# Patient Record
Sex: FEMALE | Race: WHITE | NOT HISPANIC OR LATINO | Employment: OTHER | URBAN - METROPOLITAN AREA
[De-identification: names, ages, dates, MRNs, and addresses within clinical notes are randomized per-mention and may not be internally consistent; named-entity substitution may affect disease eponyms.]

---

## 2023-04-27 PROBLEM — I10 HTN (HYPERTENSION): Status: ACTIVE | Noted: 2023-04-27

## 2023-04-27 PROBLEM — E55.9 VITAMIN D DEFICIENCY: Status: ACTIVE | Noted: 2023-04-27

## 2023-04-27 PROBLEM — L72.9 SCALP CYST: Status: ACTIVE | Noted: 2023-04-27

## 2023-04-27 PROBLEM — E78.5 HYPERLIPIDEMIA: Status: ACTIVE | Noted: 2023-04-27

## 2023-04-27 PROBLEM — M79.89 NODULE OF SOFT TISSUE: Status: ACTIVE | Noted: 2023-04-27

## 2023-04-27 PROBLEM — M89.9 DISORDER OF BONE: Status: ACTIVE | Noted: 2023-04-27

## 2023-04-27 PROBLEM — H35.30 MACULAR DEGENERATION: Status: ACTIVE | Noted: 2023-04-27

## 2023-04-27 PROBLEM — M85.80 OSTEOPENIA: Status: ACTIVE | Noted: 2023-04-27

## 2023-04-27 RX ORDER — LOSARTAN POTASSIUM 50 MG/1
1 TABLET ORAL DAILY
COMMUNITY
Start: 2018-07-12 | End: 2023-10-25

## 2023-04-27 RX ORDER — VIT C/E/ZN/COPPR/LUTEIN/ZEAXAN 250MG-90MG
CAPSULE ORAL
COMMUNITY
End: 2024-02-28 | Stop reason: ALTCHOICE

## 2023-04-27 RX ORDER — ACETAMINOPHEN 500 MG
TABLET ORAL
COMMUNITY
Start: 2021-10-14

## 2023-04-27 RX ORDER — HYDROCHLOROTHIAZIDE 12.5 MG/1
1 TABLET ORAL
COMMUNITY
Start: 2019-09-19 | End: 2024-01-29

## 2023-05-12 ENCOUNTER — OFFICE VISIT (OUTPATIENT)
Dept: PRIMARY CARE | Facility: CLINIC | Age: 73
End: 2023-05-12
Payer: MEDICARE

## 2023-05-12 VITALS
DIASTOLIC BLOOD PRESSURE: 63 MMHG | SYSTOLIC BLOOD PRESSURE: 136 MMHG | HEART RATE: 74 BPM | BODY MASS INDEX: 30.86 KG/M2 | RESPIRATION RATE: 19 BRPM | WEIGHT: 158 LBS

## 2023-05-12 DIAGNOSIS — E78.2 MIXED HYPERLIPIDEMIA: ICD-10-CM

## 2023-05-12 DIAGNOSIS — E55.9 VITAMIN D DEFICIENCY: ICD-10-CM

## 2023-05-12 DIAGNOSIS — M85.80 OSTEOPENIA, UNSPECIFIED LOCATION: ICD-10-CM

## 2023-05-12 DIAGNOSIS — I10 PRIMARY HYPERTENSION: Primary | ICD-10-CM

## 2023-05-12 PROBLEM — M79.89 NODULE OF SOFT TISSUE: Status: RESOLVED | Noted: 2023-04-27 | Resolved: 2023-05-12

## 2023-05-12 PROCEDURE — 1159F MED LIST DOCD IN RCRD: CPT | Performed by: INTERNAL MEDICINE

## 2023-05-12 PROCEDURE — 3075F SYST BP GE 130 - 139MM HG: CPT | Performed by: INTERNAL MEDICINE

## 2023-05-12 PROCEDURE — 3078F DIAST BP <80 MM HG: CPT | Performed by: INTERNAL MEDICINE

## 2023-05-12 PROCEDURE — 1160F RVW MEDS BY RX/DR IN RCRD: CPT | Performed by: INTERNAL MEDICINE

## 2023-05-12 PROCEDURE — 99214 OFFICE O/P EST MOD 30 MIN: CPT | Performed by: INTERNAL MEDICINE

## 2023-05-12 PROCEDURE — 1036F TOBACCO NON-USER: CPT | Performed by: INTERNAL MEDICINE

## 2023-05-12 ASSESSMENT — ENCOUNTER SYMPTOMS
HEADACHES: 0
DIARRHEA: 0
NERVOUS/ANXIOUS: 0
CHILLS: 0
ARTHRALGIAS: 0
SLEEP DISTURBANCE: 0
ABDOMINAL DISTENTION: 0
FLANK PAIN: 0
UNEXPECTED WEIGHT CHANGE: 0
DYSURIA: 0
SHORTNESS OF BREATH: 0
CONSTIPATION: 0
COUGH: 0
FREQUENCY: 0
ABDOMINAL PAIN: 0
NAUSEA: 0
FATIGUE: 0
DIZZINESS: 0
VOMITING: 0
APPETITE CHANGE: 0
WHEEZING: 0
CHEST TIGHTNESS: 0
LIGHT-HEADEDNESS: 0
PALPITATIONS: 0
FEVER: 0
DIFFICULTY URINATING: 0
BACK PAIN: 0
ACTIVITY CHANGE: 0
DYSPHORIC MOOD: 0

## 2023-05-12 NOTE — ASSESSMENT & PLAN NOTE
Had lipids that showed LDL of 164 in 11/2022  Not on therapy-pt refuses  Continued encourage for better diet   Will repeat at next visit  Sees cardio annually

## 2023-05-12 NOTE — PROGRESS NOTES
Subjective   Patient ID: Blaire Dahl is a 72 y.o. female who presents for Follow-up.    HPI    Pt here in 6 month follow up.  She is having some sinus issues-she will take Zyrtec if needed.  She doesn't feel she is worse during spring.      She is feeling ok overall.  She saw cardiology for her routine visit.  She is taking her BP medications as directed.  Her weight is stable.  She enjoys eating.      She had Ct cardiac score and it was zero.  She had ultrasound to breast of left breast due to abnormal mammogram that was normal.      Review of Systems   Constitutional:  Negative for activity change, appetite change, chills, fatigue, fever and unexpected weight change.   Respiratory:  Negative for cough, chest tightness, shortness of breath and wheezing.    Cardiovascular:  Negative for chest pain, palpitations and leg swelling.   Gastrointestinal:  Negative for abdominal distention, abdominal pain, constipation, diarrhea, nausea and vomiting.   Genitourinary:  Negative for difficulty urinating, dysuria, flank pain and frequency.   Musculoskeletal:  Negative for arthralgias and back pain.   Neurological:  Negative for dizziness, light-headedness and headaches.   Psychiatric/Behavioral:  Negative for dysphoric mood and sleep disturbance. The patient is not nervous/anxious.        Objective   /63   Pulse 74   Resp 19   Wt 71.7 kg (158 lb)   BMI 30.86 kg/m²    Physical Exam  Constitutional:       Appearance: Normal appearance.   Cardiovascular:      Rate and Rhythm: Normal rate and regular rhythm.      Pulses: Normal pulses.      Heart sounds: Normal heart sounds.   Pulmonary:      Effort: Pulmonary effort is normal.      Breath sounds: Normal breath sounds.   Abdominal:      General: Abdomen is flat. Bowel sounds are normal.      Palpations: Abdomen is soft.   Musculoskeletal:         General: Normal range of motion.   Neurological:      Mental Status: She is alert and oriented to person, place, and  time.   Psychiatric:         Mood and Affect: Mood normal.         Assessment/Plan   Problem List Items Addressed This Visit          Circulatory    HTN (hypertension) - Primary     Stable on current medications   Low salt diet  Encouraged more exercise         Relevant Orders    Follow Up In Primary Care       Musculoskeletal    Osteopenia    Relevant Orders    Follow Up In Primary Care       Endocrine/Metabolic    Vitamin D deficiency    Relevant Orders    Follow Up In Primary Care       Other    Hyperlipidemia     Had lipids that showed LDL of 164 in 11/2022  Not on therapy-pt refuses  Continued encourage for better diet   Will repeat at next visit  Sees cardio annually          Relevant Orders    Follow Up In Primary Care

## 2023-05-12 NOTE — PATIENT INSTRUCTIONS
Continue your medications as directed-call when refills needed  Work on being more active-healthy diet and exercise regularly   See cardiology as directed  Follow up here in 6 months for full physical (come fasting we will do blood work and urine that day)

## 2023-10-20 PROBLEM — J06.9 UPPER RESPIRATORY INFECTION: Status: ACTIVE | Noted: 2023-10-20

## 2023-10-20 PROBLEM — S39.011A STRAIN OF ABDOMINAL MUSCLE: Status: ACTIVE | Noted: 2023-10-20

## 2023-10-20 PROBLEM — J01.90 ACUTE SINUSITIS: Status: ACTIVE | Noted: 2023-10-20

## 2023-10-20 PROBLEM — N39.0 LOWER URINARY TRACT INFECTIOUS DISEASE: Status: ACTIVE | Noted: 2023-10-20

## 2023-10-21 DIAGNOSIS — I10 ESSENTIAL (PRIMARY) HYPERTENSION: ICD-10-CM

## 2023-10-24 RX ORDER — LOSARTAN POTASSIUM 50 MG/1
50 TABLET ORAL DAILY
Status: CANCELLED | OUTPATIENT
Start: 2023-10-24

## 2023-10-25 RX ORDER — LOSARTAN POTASSIUM 100 MG/1
100 TABLET ORAL DAILY
Qty: 90 TABLET | Refills: 3 | Status: SHIPPED | OUTPATIENT
Start: 2023-10-25 | End: 2024-05-07 | Stop reason: SDUPTHER

## 2023-11-17 ENCOUNTER — OFFICE VISIT (OUTPATIENT)
Dept: PRIMARY CARE | Facility: CLINIC | Age: 73
End: 2023-11-17
Payer: MEDICARE

## 2023-11-17 VITALS
WEIGHT: 158 LBS | HEART RATE: 68 BPM | HEIGHT: 60 IN | SYSTOLIC BLOOD PRESSURE: 126 MMHG | DIASTOLIC BLOOD PRESSURE: 80 MMHG | BODY MASS INDEX: 31.02 KG/M2

## 2023-11-17 DIAGNOSIS — E66.09 CLASS 1 OBESITY DUE TO EXCESS CALORIES WITH SERIOUS COMORBIDITY AND BODY MASS INDEX (BMI) OF 30.0 TO 30.9 IN ADULT: ICD-10-CM

## 2023-11-17 DIAGNOSIS — E78.2 MIXED HYPERLIPIDEMIA: ICD-10-CM

## 2023-11-17 DIAGNOSIS — I10 PRIMARY HYPERTENSION: ICD-10-CM

## 2023-11-17 DIAGNOSIS — Z00.00 MEDICARE ANNUAL WELLNESS VISIT, SUBSEQUENT: Primary | ICD-10-CM

## 2023-11-17 DIAGNOSIS — M85.80 OSTEOPENIA, UNSPECIFIED LOCATION: ICD-10-CM

## 2023-11-17 DIAGNOSIS — E55.9 VITAMIN D DEFICIENCY: ICD-10-CM

## 2023-11-17 DIAGNOSIS — R31.1 BENIGN ESSENTIAL MICROSCOPIC HEMATURIA: ICD-10-CM

## 2023-11-17 DIAGNOSIS — Z12.31 SCREENING MAMMOGRAM FOR BREAST CANCER: ICD-10-CM

## 2023-11-17 PROBLEM — E66.811 CLASS 1 OBESITY DUE TO EXCESS CALORIES WITH SERIOUS COMORBIDITY AND BODY MASS INDEX (BMI) OF 30.0 TO 30.9 IN ADULT: Status: ACTIVE | Noted: 2023-11-17

## 2023-11-17 PROBLEM — N39.0 LOWER URINARY TRACT INFECTIOUS DISEASE: Status: RESOLVED | Noted: 2023-10-20 | Resolved: 2023-11-17

## 2023-11-17 PROBLEM — J01.90 ACUTE SINUSITIS: Status: RESOLVED | Noted: 2023-10-20 | Resolved: 2023-11-17

## 2023-11-17 PROBLEM — J06.9 UPPER RESPIRATORY INFECTION: Status: RESOLVED | Noted: 2023-10-20 | Resolved: 2023-11-17

## 2023-11-17 PROBLEM — S39.011A STRAIN OF ABDOMINAL MUSCLE: Status: RESOLVED | Noted: 2023-10-20 | Resolved: 2023-11-17

## 2023-11-17 LAB
25(OH)D3 SERPL-MCNC: 21 NG/ML (ref 30–100)
ALBUMIN SERPL BCP-MCNC: 4.1 G/DL (ref 3.4–5)
ALP SERPL-CCNC: 86 U/L (ref 33–136)
ALT SERPL W P-5'-P-CCNC: 14 U/L (ref 7–45)
ANION GAP SERPL CALC-SCNC: 14 MMOL/L (ref 10–20)
APPEARANCE UR: ABNORMAL
AST SERPL W P-5'-P-CCNC: 16 U/L (ref 9–39)
BACTERIA #/AREA URNS AUTO: ABNORMAL /HPF
BILIRUB SERPL-MCNC: 0.7 MG/DL (ref 0–1.2)
BILIRUB UR STRIP.AUTO-MCNC: NEGATIVE MG/DL
BUN SERPL-MCNC: 21 MG/DL (ref 6–23)
CALCIUM SERPL-MCNC: 9.5 MG/DL (ref 8.6–10.6)
CHLORIDE SERPL-SCNC: 104 MMOL/L (ref 98–107)
CHOLEST SERPL-MCNC: 258 MG/DL (ref 0–199)
CHOLESTEROL/HDL RATIO: 4.8
CO2 SERPL-SCNC: 26 MMOL/L (ref 21–32)
COLOR UR: YELLOW
CREAT SERPL-MCNC: 0.8 MG/DL (ref 0.5–1.05)
ERYTHROCYTE [DISTWIDTH] IN BLOOD BY AUTOMATED COUNT: 13.3 % (ref 11.5–14.5)
GFR SERPL CREATININE-BSD FRML MDRD: 77 ML/MIN/1.73M*2
GLUCOSE SERPL-MCNC: 100 MG/DL (ref 74–99)
GLUCOSE UR STRIP.AUTO-MCNC: NEGATIVE MG/DL
HCT VFR BLD AUTO: 43.2 % (ref 36–46)
HDLC SERPL-MCNC: 53.4 MG/DL
HGB BLD-MCNC: 13.7 G/DL (ref 12–16)
KETONES UR STRIP.AUTO-MCNC: NEGATIVE MG/DL
LDLC SERPL CALC-MCNC: 166 MG/DL
LEUKOCYTE ESTERASE UR QL STRIP.AUTO: ABNORMAL
MCH RBC QN AUTO: 30 PG (ref 26–34)
MCHC RBC AUTO-ENTMCNC: 31.7 G/DL (ref 32–36)
MCV RBC AUTO: 95 FL (ref 80–100)
MUCOUS THREADS #/AREA URNS AUTO: ABNORMAL /LPF
NITRITE UR QL STRIP.AUTO: POSITIVE
NON HDL CHOLESTEROL: 205 MG/DL (ref 0–149)
NRBC BLD-RTO: 0 /100 WBCS (ref 0–0)
PH UR STRIP.AUTO: 5 [PH]
PLATELET # BLD AUTO: 226 X10*3/UL (ref 150–450)
POTASSIUM SERPL-SCNC: 3.9 MMOL/L (ref 3.5–5.3)
PROT SERPL-MCNC: 6.5 G/DL (ref 6.4–8.2)
PROT UR STRIP.AUTO-MCNC: NEGATIVE MG/DL
RBC # BLD AUTO: 4.56 X10*6/UL (ref 4–5.2)
RBC # UR STRIP.AUTO: NEGATIVE /UL
RBC #/AREA URNS AUTO: ABNORMAL /HPF
SODIUM SERPL-SCNC: 140 MMOL/L (ref 136–145)
SP GR UR STRIP.AUTO: 1.01
SQUAMOUS #/AREA URNS AUTO: ABNORMAL /HPF
TRIGL SERPL-MCNC: 195 MG/DL (ref 0–149)
UROBILINOGEN UR STRIP.AUTO-MCNC: <2 MG/DL
VLDL: 39 MG/DL (ref 0–40)
WBC # BLD AUTO: 5.2 X10*3/UL (ref 4.4–11.3)
WBC #/AREA URNS AUTO: ABNORMAL /HPF

## 2023-11-17 PROCEDURE — 3074F SYST BP LT 130 MM HG: CPT | Performed by: INTERNAL MEDICINE

## 2023-11-17 PROCEDURE — 1036F TOBACCO NON-USER: CPT | Performed by: INTERNAL MEDICINE

## 2023-11-17 PROCEDURE — 1170F FXNL STATUS ASSESSED: CPT | Performed by: INTERNAL MEDICINE

## 2023-11-17 PROCEDURE — 80053 COMPREHEN METABOLIC PANEL: CPT

## 2023-11-17 PROCEDURE — 36415 COLL VENOUS BLD VENIPUNCTURE: CPT

## 2023-11-17 PROCEDURE — 82306 VITAMIN D 25 HYDROXY: CPT

## 2023-11-17 PROCEDURE — 1159F MED LIST DOCD IN RCRD: CPT | Performed by: INTERNAL MEDICINE

## 2023-11-17 PROCEDURE — 80061 LIPID PANEL: CPT

## 2023-11-17 PROCEDURE — 99214 OFFICE O/P EST MOD 30 MIN: CPT | Performed by: INTERNAL MEDICINE

## 2023-11-17 PROCEDURE — 81001 URINALYSIS AUTO W/SCOPE: CPT

## 2023-11-17 PROCEDURE — 85027 COMPLETE CBC AUTOMATED: CPT

## 2023-11-17 PROCEDURE — 1160F RVW MEDS BY RX/DR IN RCRD: CPT | Performed by: INTERNAL MEDICINE

## 2023-11-17 PROCEDURE — 3079F DIAST BP 80-89 MM HG: CPT | Performed by: INTERNAL MEDICINE

## 2023-11-17 PROCEDURE — G0439 PPPS, SUBSEQ VISIT: HCPCS | Performed by: INTERNAL MEDICINE

## 2023-11-17 ASSESSMENT — ENCOUNTER SYMPTOMS
SLEEP DISTURBANCE: 0
NUMBNESS: 0
FATIGUE: 0
SINUS PAIN: 0
RECTAL PAIN: 0
POLYDIPSIA: 0
EYE REDNESS: 0
VOICE CHANGE: 0
WOUND: 0
SPEECH DIFFICULTY: 0
NERVOUS/ANXIOUS: 0
DYSPHORIC MOOD: 0
RHINORRHEA: 0
SINUS PRESSURE: 0
ABDOMINAL PAIN: 0
NECK PAIN: 0
FACIAL ASYMMETRY: 0
HEMATURIA: 0
SEIZURES: 0
ABDOMINAL DISTENTION: 0
EYE DISCHARGE: 0
ARTHRALGIAS: 1
CHOKING: 0
DECREASED CONCENTRATION: 0
CHILLS: 0
SORE THROAT: 0
APNEA: 0
EYE ITCHING: 0
ACTIVITY CHANGE: 0
NAUSEA: 0
BLOOD IN STOOL: 0
CONSTIPATION: 0
HALLUCINATIONS: 0
DIARRHEA: 0
ANAL BLEEDING: 0
DIFFICULTY URINATING: 0
STRIDOR: 0
UNEXPECTED WEIGHT CHANGE: 0
APPETITE CHANGE: 0
VOMITING: 0
HYPERACTIVE: 0
PHOTOPHOBIA: 0
FEVER: 0
WEAKNESS: 0
FACIAL SWELLING: 0
FREQUENCY: 0
POLYPHAGIA: 0
NECK STIFFNESS: 0
BACK PAIN: 0
COLOR CHANGE: 0
CHEST TIGHTNESS: 0
PALPITATIONS: 0
WHEEZING: 0
FLANK PAIN: 0
ADENOPATHY: 0
DIZZINESS: 0
AGITATION: 0
JOINT SWELLING: 0
DYSURIA: 0
SHORTNESS OF BREATH: 0
DIAPHORESIS: 0
COUGH: 0
BRUISES/BLEEDS EASILY: 0
CONFUSION: 0
HEADACHES: 0
EYE PAIN: 0
LIGHT-HEADEDNESS: 0
TROUBLE SWALLOWING: 0
TREMORS: 0
MYALGIAS: 0

## 2023-11-17 ASSESSMENT — ACTIVITIES OF DAILY LIVING (ADL)
BATHING: INDEPENDENT
DOING_HOUSEWORK: INDEPENDENT
GROCERY_SHOPPING: INDEPENDENT
DRESSING: INDEPENDENT
MANAGING_FINANCES: INDEPENDENT
TAKING_MEDICATION: INDEPENDENT

## 2023-11-17 ASSESSMENT — PATIENT HEALTH QUESTIONNAIRE - PHQ9
1. LITTLE INTEREST OR PLEASURE IN DOING THINGS: NOT AT ALL
2. FEELING DOWN, DEPRESSED OR HOPELESS: NOT AT ALL
SUM OF ALL RESPONSES TO PHQ9 QUESTIONS 1 AND 2: 0

## 2023-11-17 NOTE — PROGRESS NOTES
Subjective   Reason for Visit: Blaire Dahl is an 73 y.o. female here for a Medicare Wellness visit.     Past Medical, Surgical, and Family History reviewed and updated in chart.    Reviewed all medications by prescribing practitioner or clinical pharmacist (such as prescriptions, OTCs, herbal therapies and supplements) and documented in the medical record.    HPI  Pt here for MWV.  Her diet is the same.  She is active but does not exercise.  Her weight is stable.      She has her colonoscopies regularly as her mother had colon cancer. She last had one in 2021-she goes every 5 years.  No GI issues-bowels move without issues.      She did fall the other day after tripping on her bed spread.  She went to urgent care and they xray the knee but it was ok-no fracture.  They told her it was badly bruised.      She is suppose to wear hearing aids but she doesn't like them and she lost one.  She sees eye doc regularly.      She doesn't get vaccines normally.  She declines the flu shot.      She had mammogram late 12/2022 and did note abnormality of right breast. She had ultrasound in 1/2023 that showed cyst only-benign.      She had dexa that showed osteopenia.      Patient Care Team:  Marsha VANCE DO as PCP - General  Marsha VANCE DO as PCP - [facility] [place] [facility] Attributed Provider  Gavin Altman MD as Consulting Physician (Cardiology)     Review of Systems   Constitutional:  Negative for activity change, appetite change, chills, diaphoresis, fatigue, fever and unexpected weight change.   HENT:  Positive for postnasal drip. Negative for congestion, dental problem, drooling, ear discharge, ear pain, facial swelling, hearing loss, mouth sores, nosebleeds, rhinorrhea, sinus pressure, sinus pain, sneezing, sore throat, tinnitus, trouble swallowing and voice change.    Eyes:  Negative for photophobia, pain, discharge, redness, itching and visual disturbance.   Respiratory:  Negative for apnea, cough, choking, chest tightness,  shortness of breath, wheezing and stridor.    Cardiovascular:  Negative for chest pain, palpitations and leg swelling.   Gastrointestinal:  Negative for abdominal distention, abdominal pain, anal bleeding, blood in stool, constipation, diarrhea, nausea, rectal pain and vomiting.   Endocrine: Negative for cold intolerance, heat intolerance, polydipsia, polyphagia and polyuria.   Genitourinary:  Negative for decreased urine volume, difficulty urinating, dyspareunia, dysuria, enuresis, flank pain, frequency, genital sores, hematuria, menstrual problem, pelvic pain, urgency, vaginal bleeding, vaginal discharge and vaginal pain.   Musculoskeletal:  Positive for arthralgias (right knee pain). Negative for back pain, gait problem, joint swelling, myalgias, neck pain and neck stiffness.   Skin:  Negative for color change, pallor, rash and wound.   Allergic/Immunologic: Negative for environmental allergies, food allergies and immunocompromised state.   Neurological:  Negative for dizziness, tremors, seizures, syncope, facial asymmetry, speech difficulty, weakness, light-headedness, numbness and headaches.   Hematological:  Negative for adenopathy. Does not bruise/bleed easily.   Psychiatric/Behavioral:  Negative for agitation, behavioral problems, confusion, decreased concentration, dysphoric mood, hallucinations, self-injury, sleep disturbance and suicidal ideas. The patient is not nervous/anxious and is not hyperactive.        Objective   Vitals:  /80 (BP Location: Right arm, Patient Position: Sitting)   Pulse 68   Ht 1.524 m (5')   Wt 71.7 kg (158 lb)   BMI 30.86 kg/m²       Physical Exam  Constitutional:       Appearance: Normal appearance.   HENT:      Head: Normocephalic and atraumatic.      Right Ear: Tympanic membrane, ear canal and external ear normal. There is no impacted cerumen.      Left Ear: Tympanic membrane, ear canal and external ear normal. There is no impacted cerumen.      Nose: Nose normal. No  congestion or rhinorrhea.      Mouth/Throat:      Mouth: Mucous membranes are moist.      Pharynx: Oropharynx is clear. No oropharyngeal exudate or posterior oropharyngeal erythema.   Eyes:      Extraocular Movements: Extraocular movements intact.      Conjunctiva/sclera: Conjunctivae normal.      Pupils: Pupils are equal, round, and reactive to light.   Neck:      Vascular: No carotid bruit.   Cardiovascular:      Rate and Rhythm: Normal rate and regular rhythm.      Pulses: Normal pulses.      Heart sounds: Normal heart sounds. No murmur heard.  Pulmonary:      Effort: Pulmonary effort is normal. No respiratory distress.      Breath sounds: Normal breath sounds. No wheezing, rhonchi or rales.   Abdominal:      General: Abdomen is flat. Bowel sounds are normal. There is no distension.      Palpations: Abdomen is soft.      Tenderness: There is no abdominal tenderness.      Hernia: No hernia is present.   Musculoskeletal:         General: No swelling or tenderness. Normal range of motion.      Cervical back: Normal range of motion and neck supple.      Right lower leg: No edema.      Left lower leg: No edema.   Lymphadenopathy:      Cervical: No cervical adenopathy.   Skin:     General: Skin is warm and dry.      Findings: No lesion or rash.   Neurological:      General: No focal deficit present.      Mental Status: She is alert and oriented to person, place, and time.      Cranial Nerves: No cranial nerve deficit.      Sensory: No sensory deficit.      Motor: No weakness.   Psychiatric:         Mood and Affect: Mood normal.         Behavior: Behavior normal.         Thought Content: Thought content normal.         Judgment: Judgment normal.         Assessment/Plan   Problem List Items Addressed This Visit       HTN (hypertension)    Current Assessment & Plan     Well controlled on hydrochlorothiazide/losartan   She sees cardiology yearly          Relevant Orders    Follow Up In Primary Care - Established     Comprehensive Metabolic Panel    CBC    Hyperlipidemia    Relevant Orders    Follow Up In Primary Care - Established    Lipid Panel    Osteopenia    Current Assessment & Plan     Encouraged her to start calcium 1200 mg along with her vitamin D   Weight based exercises          Relevant Orders    Follow Up In Primary Care - Established    Vitamin D deficiency    Relevant Orders    Follow Up In Primary Care - Established    Vitamin D 25-Hydroxy,Total (for eval of Vitamin D levels)    Class 1 obesity due to excess calories with serious comorbidity and body mass index (BMI) of 30.0 to 30.9 in adult    Current Assessment & Plan     Encouraged patient to increase exercise and to try to eat healthier          Relevant Orders    Follow Up In Primary Care - Established     Other Visit Diagnoses       Medicare annual wellness visit, subsequent    -  Primary    Screening mammogram for breast cancer        Relevant Orders    BI mammo bilateral screening tomosynthesis    Benign essential microscopic hematuria        Relevant Orders    Urinalysis with Reflex Microscopic

## 2023-11-17 NOTE — PATIENT INSTRUCTIONS
Get back into dermatologist to check the skin lesion on the left arm   We did blood work today and will call with results   Get a living will and DPOA of healthcare with all your medical wishes and bring us a copy so we can put in chart  Get your mammogram done when due-after 12/23/2023-order is in  Take calcium 1200 mg/day and vitamin D3 2000 units for bone health  Continue healthy diet and exercise regularly as able  Follow up in 6 months-sooner if needed

## 2023-11-21 ENCOUNTER — TELEPHONE (OUTPATIENT)
Dept: PRIMARY CARE | Facility: CLINIC | Age: 73
End: 2023-11-21
Payer: MEDICARE

## 2023-11-21 NOTE — TELEPHONE ENCOUNTER
----- Message from Marsha VANCE DO sent at 11/20/2023  7:52 AM EST -----  Make sure pt not having any UTI symptoms as her urine did show some signs of possible infection  Her vitamin D was low so needs to take OTC D3 5131-4201 units/day   Her cholesterol is high with LDL of 166 which goal is 100 or less-so recommend better diet-lean meats/fish and veggies

## 2023-12-19 ENCOUNTER — OFFICE VISIT (OUTPATIENT)
Dept: CARDIOLOGY | Facility: CLINIC | Age: 73
End: 2023-12-19
Payer: MEDICARE

## 2023-12-19 VITALS
BODY MASS INDEX: 30.02 KG/M2 | SYSTOLIC BLOOD PRESSURE: 120 MMHG | HEIGHT: 61 IN | DIASTOLIC BLOOD PRESSURE: 80 MMHG | HEART RATE: 74 BPM | OXYGEN SATURATION: 96 % | WEIGHT: 159 LBS

## 2023-12-19 DIAGNOSIS — E66.09 CLASS 1 OBESITY DUE TO EXCESS CALORIES WITH SERIOUS COMORBIDITY AND BODY MASS INDEX (BMI) OF 30.0 TO 30.9 IN ADULT: ICD-10-CM

## 2023-12-19 DIAGNOSIS — I10 PRIMARY HYPERTENSION: ICD-10-CM

## 2023-12-19 DIAGNOSIS — E78.49 OTHER HYPERLIPIDEMIA: Primary | ICD-10-CM

## 2023-12-19 PROCEDURE — 3074F SYST BP LT 130 MM HG: CPT | Performed by: INTERNAL MEDICINE

## 2023-12-19 PROCEDURE — 99213 OFFICE O/P EST LOW 20 MIN: CPT | Performed by: INTERNAL MEDICINE

## 2023-12-19 PROCEDURE — 1159F MED LIST DOCD IN RCRD: CPT | Performed by: INTERNAL MEDICINE

## 2023-12-19 PROCEDURE — 1036F TOBACCO NON-USER: CPT | Performed by: INTERNAL MEDICINE

## 2023-12-19 PROCEDURE — 1160F RVW MEDS BY RX/DR IN RCRD: CPT | Performed by: INTERNAL MEDICINE

## 2023-12-19 PROCEDURE — 3079F DIAST BP 80-89 MM HG: CPT | Performed by: INTERNAL MEDICINE

## 2023-12-19 RX ORDER — VIT A/VIT C/VIT E/ZINC/COPPER 2148-113
TABLET ORAL
COMMUNITY

## 2023-12-19 ASSESSMENT — ENCOUNTER SYMPTOMS: DYSPNEA ON EXERTION: 1

## 2023-12-19 NOTE — PATIENT INSTRUCTIONS
Your CT calcium score from last year is zero.  Your risk of heart attack is very low.    No changes in your medications.

## 2023-12-19 NOTE — PROGRESS NOTES
"Subjective   Blaire Dahl is a 73 y.o. female.    Chief Complaint:  Follow-up hypertension, hyperlipidemia, CT calcium scoring.    HPI    Over the past year she has felt well.  She does go to weight watchers to try to lose weight.  However she does not really follow the program very well.  Does not exercise on a regular basis.  No chest pain or chest pressure.  Mild dyspnea with exertion.  Otherwise she has been asymptomatic.  No other medical problems or medical issues since we last saw her.    Her past cardiac history is significant for history of hypertension. She's had a prior calcium score several years ago which was zero indicating the absence of atherosclerosis in the distribution of the coronary arteries.     She does have history of hyperlipidemia. On prior CT scan in her ascending aorta was slightly dilated.     Allergies  Medication    · No Known Drug Allergies   Recorded By: Kayleen Call; 1/10/2015 12:49:29 PM     Family History  Mother    · Family history of diabetes mellitus (V18.0) (Z83.3)   · Family history of malignant neoplasm of colon (V16.0) (Z80.0)   ·  at age 62 from colon cancer            Father    · Family history of Aneurysm   · Family history of aortic aneurysm (V17.49) (Z82.49)   · Family history of hypertension (V17.49) (Z82.49)     Social History  Problems    · Caffeine use (V49.89) (Z78.9)   · 1-2 cups coffee per day   · Has no children   ·    · Never a smoker      Review of Systems   Cardiovascular:  Positive for dyspnea on exertion.   Musculoskeletal:  Positive for arthritis.   All other systems reviewed and are negative.      Visit Vitals  /80 (BP Location: Left arm, Patient Position: Sitting, BP Cuff Size: Adult)   Pulse 74   Ht 1.549 m (5' 1\")   Wt 72.1 kg (159 lb)   SpO2 96%   BMI 30.04 kg/m²   Smoking Status Never   BSA 1.76 m²        Objective     Constitutional:       Appearance: Not in distress.   Neck:      Vascular: JVD normal.   Pulmonary:      " Breath sounds: Normal breath sounds.   Cardiovascular:      Normal rate. Regular rhythm. Normal S1. Normal S2.       Murmurs: There is no murmur.      No gallop.    Pulses:     Intact distal pulses.   Edema:     Peripheral edema absent.   Abdominal:      General: There is no distension.      Palpations: Abdomen is soft.   Neurological:      Mental Status: Alert.         Lab Review:   Lab Results   Component Value Date     11/17/2023    K 3.9 11/17/2023     11/17/2023    CO2 26 11/17/2023    BUN 21 11/17/2023    CREATININE 0.80 11/17/2023    GLUCOSE 100 (H) 11/17/2023    CALCIUM 9.5 11/17/2023     Lab Results   Component Value Date    CHOL 258 (H) 11/17/2023    TRIG 195 (H) 11/17/2023    HDL 53.4 11/17/2023       Assessment:    1.  Hypertension.  Blood pressures are well-controlled.    2.  Hyperlipidemia.  Patient CT calcium score is 0.  We personally reviewed the CT scan with the patient.  There is no atherosclerosis in the distribution of the coronary arteries.  This makes her risk of myocardial infarction is extremely low.  Under the circumstances lipid therapy is not recommended.   Other (Free Text): Today's visit was planned to perform a  biopsy to determine questionable involvement of surrounding erythematous area as demonstrated by the photo take at time of biopsy. On exam today, the 'questionable area' had completely resolved, so much so that i felt comfortable treating with ED&C today.  Patient agreed with the plan, and was appreciative Detail Level: Zone Note Text (......Xxx Chief Complaint.): This diagnosis correlates with the

## 2023-12-29 ENCOUNTER — APPOINTMENT (OUTPATIENT)
Dept: RADIOLOGY | Facility: CLINIC | Age: 73
End: 2023-12-29
Payer: MEDICARE

## 2024-01-03 ENCOUNTER — ANCILLARY PROCEDURE (OUTPATIENT)
Dept: RADIOLOGY | Facility: CLINIC | Age: 74
End: 2024-01-03
Payer: MEDICARE

## 2024-01-03 DIAGNOSIS — Z12.31 SCREENING MAMMOGRAM FOR BREAST CANCER: ICD-10-CM

## 2024-01-03 PROCEDURE — 77063 BREAST TOMOSYNTHESIS BI: CPT

## 2024-01-03 PROCEDURE — 77067 SCR MAMMO BI INCL CAD: CPT | Performed by: RADIOLOGY

## 2024-01-03 PROCEDURE — 77063 BREAST TOMOSYNTHESIS BI: CPT | Performed by: RADIOLOGY

## 2024-01-28 DIAGNOSIS — I10 ESSENTIAL (PRIMARY) HYPERTENSION: ICD-10-CM

## 2024-01-29 RX ORDER — HYDROCHLOROTHIAZIDE 12.5 MG/1
12.5 TABLET ORAL
Qty: 90 TABLET | Refills: 3 | Status: SHIPPED | OUTPATIENT
Start: 2024-01-29

## 2024-02-28 ENCOUNTER — OFFICE VISIT (OUTPATIENT)
Dept: PRIMARY CARE | Facility: CLINIC | Age: 74
End: 2024-02-28
Payer: MEDICARE

## 2024-02-28 VITALS
DIASTOLIC BLOOD PRESSURE: 93 MMHG | WEIGHT: 159.1 LBS | BODY MASS INDEX: 32.08 KG/M2 | SYSTOLIC BLOOD PRESSURE: 159 MMHG | HEART RATE: 81 BPM | HEIGHT: 59 IN

## 2024-02-28 DIAGNOSIS — E55.9 VITAMIN D DEFICIENCY: ICD-10-CM

## 2024-02-28 DIAGNOSIS — R10.9 RIGHT FLANK DISCOMFORT: ICD-10-CM

## 2024-02-28 DIAGNOSIS — E78.2 MIXED HYPERLIPIDEMIA: ICD-10-CM

## 2024-02-28 DIAGNOSIS — R23.2 HOT FLASHES: ICD-10-CM

## 2024-02-28 DIAGNOSIS — E66.09 CLASS 1 OBESITY DUE TO EXCESS CALORIES WITH SERIOUS COMORBIDITY AND BODY MASS INDEX (BMI) OF 32.0 TO 32.9 IN ADULT: ICD-10-CM

## 2024-02-28 DIAGNOSIS — I10 PRIMARY HYPERTENSION: Primary | ICD-10-CM

## 2024-02-28 DIAGNOSIS — R14.0 ABDOMINAL DISTENSION (GASEOUS): ICD-10-CM

## 2024-02-28 DIAGNOSIS — R68.83 CHILLS: ICD-10-CM

## 2024-02-28 LAB
ALBUMIN SERPL BCP-MCNC: 4.1 G/DL (ref 3.4–5)
ALP SERPL-CCNC: 84 U/L (ref 33–136)
ALT SERPL W P-5'-P-CCNC: 12 U/L (ref 7–45)
ANION GAP SERPL CALC-SCNC: 15 MMOL/L (ref 10–20)
APPEARANCE UR: ABNORMAL
AST SERPL W P-5'-P-CCNC: 14 U/L (ref 9–39)
BASOPHILS # BLD AUTO: 0.03 X10*3/UL (ref 0–0.1)
BASOPHILS NFR BLD AUTO: 0.6 %
BILIRUB SERPL-MCNC: 0.4 MG/DL (ref 0–1.2)
BILIRUB UR STRIP.AUTO-MCNC: NEGATIVE MG/DL
BUN SERPL-MCNC: 23 MG/DL (ref 6–23)
CALCIUM SERPL-MCNC: 9.5 MG/DL (ref 8.6–10.6)
CAOX CRY #/AREA UR COMP ASSIST: ABNORMAL /HPF
CHLORIDE SERPL-SCNC: 107 MMOL/L (ref 98–107)
CO2 SERPL-SCNC: 25 MMOL/L (ref 21–32)
COLOR UR: ABNORMAL
CREAT SERPL-MCNC: 0.78 MG/DL (ref 0.5–1.05)
EGFRCR SERPLBLD CKD-EPI 2021: 79 ML/MIN/1.73M*2
EOSINOPHIL # BLD AUTO: 0.13 X10*3/UL (ref 0–0.4)
EOSINOPHIL NFR BLD AUTO: 2.7 %
ERYTHROCYTE [DISTWIDTH] IN BLOOD BY AUTOMATED COUNT: 13.2 % (ref 11.5–14.5)
GLUCOSE SERPL-MCNC: 101 MG/DL (ref 74–99)
GLUCOSE UR STRIP.AUTO-MCNC: NORMAL MG/DL
HCT VFR BLD AUTO: 41.2 % (ref 36–46)
HGB BLD-MCNC: 13.3 G/DL (ref 12–16)
HOLD SPECIMEN: NORMAL
IMM GRANULOCYTES # BLD AUTO: 0.02 X10*3/UL (ref 0–0.5)
IMM GRANULOCYTES NFR BLD AUTO: 0.4 % (ref 0–0.9)
KETONES UR STRIP.AUTO-MCNC: NEGATIVE MG/DL
LEUKOCYTE ESTERASE UR QL STRIP.AUTO: ABNORMAL
LYMPHOCYTES # BLD AUTO: 0.95 X10*3/UL (ref 0.8–3)
LYMPHOCYTES NFR BLD AUTO: 20 %
MCH RBC QN AUTO: 29.9 PG (ref 26–34)
MCHC RBC AUTO-ENTMCNC: 32.3 G/DL (ref 32–36)
MCV RBC AUTO: 93 FL (ref 80–100)
MONOCYTES # BLD AUTO: 0.42 X10*3/UL (ref 0.05–0.8)
MONOCYTES NFR BLD AUTO: 8.8 %
MUCOUS THREADS #/AREA URNS AUTO: ABNORMAL /LPF
NEUTROPHILS # BLD AUTO: 3.21 X10*3/UL (ref 1.6–5.5)
NEUTROPHILS NFR BLD AUTO: 67.5 %
NITRITE UR QL STRIP.AUTO: ABNORMAL
NRBC BLD-RTO: 0 /100 WBCS (ref 0–0)
PH UR STRIP.AUTO: 5 [PH]
PLATELET # BLD AUTO: 228 X10*3/UL (ref 150–450)
POTASSIUM SERPL-SCNC: 4.1 MMOL/L (ref 3.5–5.3)
PROT SERPL-MCNC: 6.3 G/DL (ref 6.4–8.2)
PROT UR STRIP.AUTO-MCNC: NEGATIVE MG/DL
RBC # BLD AUTO: 4.45 X10*6/UL (ref 4–5.2)
RBC # UR STRIP.AUTO: NEGATIVE /UL
RBC #/AREA URNS AUTO: ABNORMAL /HPF
SODIUM SERPL-SCNC: 143 MMOL/L (ref 136–145)
SP GR UR STRIP.AUTO: 1.02
TSH SERPL-ACNC: 3.36 MIU/L (ref 0.44–3.98)
UROBILINOGEN UR STRIP.AUTO-MCNC: NORMAL MG/DL
WBC # BLD AUTO: 4.8 X10*3/UL (ref 4.4–11.3)
WBC #/AREA URNS AUTO: ABNORMAL /HPF
YEAST BUDDING #/AREA UR COMP ASSIST: PRESENT /HPF

## 2024-02-28 PROCEDURE — 3080F DIAST BP >= 90 MM HG: CPT | Performed by: INTERNAL MEDICINE

## 2024-02-28 PROCEDURE — 3077F SYST BP >= 140 MM HG: CPT | Performed by: INTERNAL MEDICINE

## 2024-02-28 PROCEDURE — 36415 COLL VENOUS BLD VENIPUNCTURE: CPT

## 2024-02-28 PROCEDURE — 1159F MED LIST DOCD IN RCRD: CPT | Performed by: INTERNAL MEDICINE

## 2024-02-28 PROCEDURE — 85025 COMPLETE CBC W/AUTO DIFF WBC: CPT

## 2024-02-28 PROCEDURE — 84443 ASSAY THYROID STIM HORMONE: CPT

## 2024-02-28 PROCEDURE — 1160F RVW MEDS BY RX/DR IN RCRD: CPT | Performed by: INTERNAL MEDICINE

## 2024-02-28 PROCEDURE — 81001 URINALYSIS AUTO W/SCOPE: CPT

## 2024-02-28 PROCEDURE — 80053 COMPREHEN METABOLIC PANEL: CPT

## 2024-02-28 PROCEDURE — 87086 URINE CULTURE/COLONY COUNT: CPT

## 2024-02-28 PROCEDURE — 99214 OFFICE O/P EST MOD 30 MIN: CPT | Performed by: INTERNAL MEDICINE

## 2024-02-28 PROCEDURE — 1036F TOBACCO NON-USER: CPT | Performed by: INTERNAL MEDICINE

## 2024-02-28 PROCEDURE — 87186 SC STD MICRODIL/AGAR DIL: CPT

## 2024-02-28 ASSESSMENT — ENCOUNTER SYMPTOMS
PALPITATIONS: 0
LIGHT-HEADEDNESS: 0
NAUSEA: 0
ABDOMINAL PAIN: 0
HEMATURIA: 0
FREQUENCY: 0
WHEEZING: 0
DIARRHEA: 0
UNEXPECTED WEIGHT CHANGE: 0
FEVER: 0
ABDOMINAL DISTENTION: 0
DYSURIA: 0
FATIGUE: 0
DIZZINESS: 0
RECTAL PAIN: 0
SHORTNESS OF BREATH: 0
FLANK PAIN: 0
DIAPHORESIS: 0
APPETITE CHANGE: 0
VOMITING: 0
CONSTIPATION: 0
CHILLS: 1
ANAL BLEEDING: 0
COUGH: 0
DIFFICULTY URINATING: 0
CHEST TIGHTNESS: 0
HEADACHES: 0
BLOOD IN STOOL: 0
ACTIVITY CHANGE: 0

## 2024-02-28 ASSESSMENT — PATIENT HEALTH QUESTIONNAIRE - PHQ9
1. LITTLE INTEREST OR PLEASURE IN DOING THINGS: NOT AT ALL
SUM OF ALL RESPONSES TO PHQ9 QUESTIONS 1 AND 2: 0
2. FEELING DOWN, DEPRESSED OR HOPELESS: NOT AT ALL

## 2024-02-28 NOTE — ASSESSMENT & PLAN NOTE
Having increased gas/right sided flank/abdominal pain  Did labs and urine today   Want her to try to lessen acidic foods in case GERD related  Encouraged low FODMAP diet

## 2024-02-28 NOTE — PROGRESS NOTES
"Subjective   Patient ID: Blaire Dahl is a 73 y.o. female who presents for Follow-up.    HPI  Pt here for 6 month follow up.  She in on [facility] for weight management but her weight is very stable-no major changes.      She has a pain right flank/abdomen that started last month.  She also has chills/hot flashes.  She denies any bowel changes and no urinary issues.  The pain comes and goes and is very fleeting.  She had UTI back in 11/2023. She has some increase gas/belching.   She had colonoscopy in 2021 that was normal-repeat noted for 5 years.      She saw Dr. Altman in 12/2023 and made no changes in medication.      She had normal mammogram in 1/2024.      Review of Systems   Constitutional:  Positive for chills. Negative for activity change, appetite change, diaphoresis, fatigue, fever and unexpected weight change.   Respiratory:  Negative for cough, chest tightness, shortness of breath and wheezing.    Cardiovascular:  Negative for chest pain, palpitations and leg swelling.   Gastrointestinal:  Negative for abdominal distention, abdominal pain, anal bleeding, blood in stool, constipation, diarrhea, nausea, rectal pain and vomiting.   Endocrine: Positive for heat intolerance.   Genitourinary:  Negative for difficulty urinating, dysuria, flank pain, frequency, hematuria, pelvic pain and urgency.   Neurological:  Negative for dizziness, light-headedness and headaches.       Objective   BP (!) 159/93 (BP Location: Right arm, Patient Position: Sitting, BP Cuff Size: Adult)   Pulse 81   Ht 1.5 m (4' 11.06\")   Wt 72.2 kg (159 lb 1.6 oz)   BMI 32.07 kg/m²    Physical Exam  Constitutional:       Appearance: Normal appearance.   Cardiovascular:      Rate and Rhythm: Normal rate and regular rhythm.      Heart sounds: Normal heart sounds.   Pulmonary:      Effort: Pulmonary effort is normal.      Breath sounds: Normal breath sounds.   Abdominal:      General: Abdomen is flat. Bowel sounds are normal. There is no " distension.      Palpations: Abdomen is soft. There is no mass.      Tenderness: There is no abdominal tenderness. There is no right CVA tenderness, left CVA tenderness, guarding or rebound.      Hernia: No hernia is present.   Musculoskeletal:      Right lower leg: No edema.      Left lower leg: No edema.   Lymphadenopathy:      Cervical: No cervical adenopathy.   Neurological:      Mental Status: She is alert and oriented to person, place, and time.   Psychiatric:         Mood and Affect: Mood normal.         Assessment/Plan   Problem List Items Addressed This Visit       HTN (hypertension) - Primary     BP high currently  Hasn't taken her meds and is very anxious today          Relevant Orders    Follow Up In Primary Care - Established    Hyperlipidemia     Not on statin  Had CT cardiac that was zero  Sees cardio          Relevant Orders    Follow Up In Primary Care - Established    Vitamin D deficiency    Class 1 obesity due to excess calories with serious comorbidity and body mass index (BMI) of 32.0 to 32.9 in adult    Abdominal distension (gaseous)     Having increased gas/right sided flank/abdominal pain  Did labs and urine today   Want her to try to lessen acidic foods in case GERD related  Encouraged low FODMAP diet           Other Visit Diagnoses       Right flank discomfort        Relevant Orders    Comprehensive Metabolic Panel    CBC and Auto Differential    Follow Up In Primary Care - Established    Urinalysis with Reflex Culture and Microscopic    Hot flashes        Relevant Orders    TSH with reflex to Free T4 if abnormal    Chills        Relevant Orders    TSH with reflex to Free T4 if abnormal

## 2024-02-28 NOTE — PATIENT INSTRUCTIONS
Right sided pain could be from increased acid production  Avoid and/or lessen amount of tomato based foods/caffeine/alcohol/tobacco use/spicy foods/peppers/onions/citrus and antiinflammatories as well as avoiding eating late at night   Adhere to low FODMAP diet-see form (these are the most common foods that cause increase gas)  We did some blood work today and will call with results  Continue all medications as directed-call when refills needed  Follow up in 3 months-sooner if needed

## 2024-02-29 ENCOUNTER — TELEPHONE (OUTPATIENT)
Dept: PRIMARY CARE | Facility: CLINIC | Age: 74
End: 2024-02-29
Payer: MEDICARE

## 2024-02-29 DIAGNOSIS — N30.01 ACUTE CYSTITIS WITH HEMATURIA: Primary | ICD-10-CM

## 2024-02-29 RX ORDER — SULFAMETHOXAZOLE AND TRIMETHOPRIM 800; 160 MG/1; MG/1
1 TABLET ORAL 2 TIMES DAILY
Qty: 6 TABLET | Refills: 0 | Status: SHIPPED | OUTPATIENT
Start: 2024-02-29 | End: 2024-03-03

## 2024-02-29 NOTE — TELEPHONE ENCOUNTER
----- Message from Marsha VANCE DO sent at 2/29/2024  8:29 AM EST -----  Urine is concerning for possible infection vs possible kidney stone  Will send in medication for urine infection to start while we wait for culture to return -only 3 days of treatment needed at this time  Liver/kidney and blood counts all were normal

## 2024-03-01 ENCOUNTER — TELEPHONE (OUTPATIENT)
Dept: PRIMARY CARE | Facility: CLINIC | Age: 74
End: 2024-03-01
Payer: MEDICARE

## 2024-03-01 LAB — BACTERIA UR CULT: ABNORMAL

## 2024-03-01 NOTE — TELEPHONE ENCOUNTER
----- Message from Marsha VANCE DO sent at 3/1/2024  7:56 AM EST -----  Pt has UTI-confirmed by culture-make sure she is taking the Bactrim and take all 3 days-this is likely why she didn't feel well lately

## 2024-05-07 DIAGNOSIS — I10 ESSENTIAL (PRIMARY) HYPERTENSION: ICD-10-CM

## 2024-05-07 RX ORDER — LOSARTAN POTASSIUM 100 MG/1
100 TABLET ORAL DAILY
Qty: 90 TABLET | Refills: 3 | Status: SHIPPED | OUTPATIENT
Start: 2024-05-07 | End: 2025-05-07

## 2024-10-31 ENCOUNTER — OFFICE VISIT (OUTPATIENT)
Dept: PRIMARY CARE | Facility: CLINIC | Age: 74
End: 2024-10-31
Payer: MEDICARE

## 2024-10-31 VITALS
OXYGEN SATURATION: 100 % | WEIGHT: 154.9 LBS | BODY MASS INDEX: 30.41 KG/M2 | HEART RATE: 83 BPM | SYSTOLIC BLOOD PRESSURE: 142 MMHG | TEMPERATURE: 98.3 F | HEIGHT: 60 IN | RESPIRATION RATE: 16 BRPM | DIASTOLIC BLOOD PRESSURE: 83 MMHG

## 2024-10-31 DIAGNOSIS — E66.09 CLASS 1 OBESITY DUE TO EXCESS CALORIES WITH SERIOUS COMORBIDITY AND BODY MASS INDEX (BMI) OF 30.0 TO 30.9 IN ADULT: ICD-10-CM

## 2024-10-31 DIAGNOSIS — I10 PRIMARY HYPERTENSION: Primary | ICD-10-CM

## 2024-10-31 DIAGNOSIS — E66.811 CLASS 1 OBESITY DUE TO EXCESS CALORIES WITH SERIOUS COMORBIDITY AND BODY MASS INDEX (BMI) OF 30.0 TO 30.9 IN ADULT: ICD-10-CM

## 2024-10-31 DIAGNOSIS — M85.80 OSTEOPENIA, UNSPECIFIED LOCATION: ICD-10-CM

## 2024-10-31 PROCEDURE — 1158F ADVNC CARE PLAN TLK DOCD: CPT | Performed by: INTERNAL MEDICINE

## 2024-10-31 PROCEDURE — 3077F SYST BP >= 140 MM HG: CPT | Performed by: INTERNAL MEDICINE

## 2024-10-31 PROCEDURE — 1160F RVW MEDS BY RX/DR IN RCRD: CPT | Performed by: INTERNAL MEDICINE

## 2024-10-31 PROCEDURE — 1123F ACP DISCUSS/DSCN MKR DOCD: CPT | Performed by: INTERNAL MEDICINE

## 2024-10-31 PROCEDURE — 1170F FXNL STATUS ASSESSED: CPT | Performed by: INTERNAL MEDICINE

## 2024-10-31 PROCEDURE — 3079F DIAST BP 80-89 MM HG: CPT | Performed by: INTERNAL MEDICINE

## 2024-10-31 PROCEDURE — 1159F MED LIST DOCD IN RCRD: CPT | Performed by: INTERNAL MEDICINE

## 2024-10-31 PROCEDURE — 99213 OFFICE O/P EST LOW 20 MIN: CPT | Performed by: INTERNAL MEDICINE

## 2024-10-31 ASSESSMENT — ENCOUNTER SYMPTOMS
SHORTNESS OF BREATH: 0
DYSURIA: 0
WHEEZING: 0
ARTHRALGIAS: 0
DIFFICULTY URINATING: 0
LIGHT-HEADEDNESS: 0
CONFUSION: 0
ABDOMINAL PAIN: 0
DECREASED CONCENTRATION: 0
HEADACHES: 0
DIAPHORESIS: 0
LOSS OF SENSATION IN FEET: 0
FLANK PAIN: 0
PALPITATIONS: 0
DIZZINESS: 0
FREQUENCY: 0
FEVER: 0
UNEXPECTED WEIGHT CHANGE: 0
OCCASIONAL FEELINGS OF UNSTEADINESS: 0
VOMITING: 0
FATIGUE: 0
NERVOUS/ANXIOUS: 0
CHEST TIGHTNESS: 0
APPETITE CHANGE: 0
BACK PAIN: 0
CHILLS: 0
DIARRHEA: 0
ACTIVITY CHANGE: 0
NAUSEA: 0
DEPRESSION: 0
CONSTIPATION: 0
SLEEP DISTURBANCE: 0
COUGH: 0

## 2024-10-31 ASSESSMENT — ACTIVITIES OF DAILY LIVING (ADL)
MANAGING_FINANCES: INDEPENDENT
GROCERY_SHOPPING: INDEPENDENT
DRESSING: INDEPENDENT
BATHING: INDEPENDENT
TAKING_MEDICATION: INDEPENDENT
DOING_HOUSEWORK: INDEPENDENT

## 2024-11-01 ENCOUNTER — APPOINTMENT (OUTPATIENT)
Dept: PRIMARY CARE | Facility: CLINIC | Age: 74
End: 2024-11-01
Payer: MEDICARE

## 2024-11-05 ENCOUNTER — APPOINTMENT (OUTPATIENT)
Dept: PRIMARY CARE | Facility: CLINIC | Age: 74
End: 2024-11-05
Payer: MEDICARE

## 2024-12-04 ENCOUNTER — APPOINTMENT (OUTPATIENT)
Dept: PRIMARY CARE | Facility: CLINIC | Age: 74
End: 2024-12-04
Payer: MEDICARE

## 2024-12-09 PROBLEM — H25.13 NUCLEAR SENILE CATARACT OF BOTH EYES: Status: ACTIVE | Noted: 2024-08-21

## 2024-12-09 PROBLEM — M89.9 DISORDER OF BONE: Status: RESOLVED | Noted: 2023-04-27 | Resolved: 2024-12-09

## 2024-12-19 ENCOUNTER — APPOINTMENT (OUTPATIENT)
Dept: CARDIOLOGY | Facility: CLINIC | Age: 74
End: 2024-12-19
Payer: MEDICARE

## 2024-12-19 VITALS
WEIGHT: 156 LBS | DIASTOLIC BLOOD PRESSURE: 80 MMHG | OXYGEN SATURATION: 94 % | SYSTOLIC BLOOD PRESSURE: 120 MMHG | HEART RATE: 78 BPM | HEIGHT: 60 IN | BODY MASS INDEX: 30.63 KG/M2

## 2024-12-19 DIAGNOSIS — E78.49 OTHER HYPERLIPIDEMIA: ICD-10-CM

## 2024-12-19 DIAGNOSIS — I10 PRIMARY HYPERTENSION: Primary | ICD-10-CM

## 2024-12-19 LAB
ATRIAL RATE: 68 BPM
P AXIS: -27 DEGREES
P OFFSET: 192 MS
P ONSET: 155 MS
PR INTERVAL: 134 MS
Q ONSET: 222 MS
QRS COUNT: 11 BEATS
QRS DURATION: 98 MS
QT INTERVAL: 410 MS
QTC CALCULATION(BAZETT): 435 MS
QTC FREDERICIA: 427 MS
R AXIS: -49 DEGREES
T AXIS: -6 DEGREES
T OFFSET: 427 MS
VENTRICULAR RATE: 68 BPM

## 2024-12-19 PROCEDURE — 1036F TOBACCO NON-USER: CPT | Performed by: INTERNAL MEDICINE

## 2024-12-19 PROCEDURE — 99213 OFFICE O/P EST LOW 20 MIN: CPT | Performed by: INTERNAL MEDICINE

## 2024-12-19 PROCEDURE — 3074F SYST BP LT 130 MM HG: CPT | Performed by: INTERNAL MEDICINE

## 2024-12-19 PROCEDURE — 1123F ACP DISCUSS/DSCN MKR DOCD: CPT | Performed by: INTERNAL MEDICINE

## 2024-12-19 PROCEDURE — 3079F DIAST BP 80-89 MM HG: CPT | Performed by: INTERNAL MEDICINE

## 2024-12-19 PROCEDURE — 1159F MED LIST DOCD IN RCRD: CPT | Performed by: INTERNAL MEDICINE

## 2024-12-19 PROCEDURE — 93005 ELECTROCARDIOGRAM TRACING: CPT | Performed by: INTERNAL MEDICINE

## 2024-12-19 NOTE — PATIENT INSTRUCTIONS
Your risk of heart attack is almost zero.    Your blood pressure is excellent.    No restrictions on activities.

## 2024-12-19 NOTE — PROGRESS NOTES
Subjective   Blarie Dahl is a 74 y.o. female.    Chief Complaint:  Follow-up hypertension and hyperlipidemia.    HPI    She has been attending weight watchers.  She is trying to lose weight.  Having some difficulty with this.  No chest pains or pressure.  Mild exertional dyspnea.  No other medical problems or major medical issues since her last visit.  Gets very concerned about things such as blood tests as she thinks she is going to get very bad reports.    Her past cardiac history is significant for history of hypertension. She's had a prior calcium score several years ago which was zero indicating the absence of atherosclerosis in the distribution of the coronary arteries.     She does have history of hyperlipidemia. On prior CT scan in her ascending aorta was slightly dilated.      Allergies  Medication    · No Known Drug Allergies     Family History  Mother    · Family history of diabetes mellitus (V18.0) (Z83.3)            Father    · Family history of Aneurysm   · Family history of aortic aneurysm (V17.49) (Z82.49)   · Family history of hypertension (V17.49) (Z82.49)     Social History  Problems    · Has no children   ·    · Never a smoker        Review of Systems   Cardiovascular:  Positive for dyspnea on exertion.   Musculoskeletal:  Positive for arthritis.   All other systems reviewed and are negative.    Current Outpatient Medications   Medication Sig Dispense Refill    cholecalciferol (Vitamin D-3) 50 mcg (2,000 unit) capsule Take by mouth once daily.      hydroCHLOROthiazide (HYDRODiuril) 12.5 mg tablet TAKE 1 TABLET BY MOUTH EVERY DAY IN THE MORNING 90 tablet 3    losartan (Cozaar) 100 mg tablet Take 1 tablet (100 mg) by mouth once daily. 90 tablet 3    vitamins A,C,E-zinc-copper (PreserVision AREDS) 2,148 mcg-113 mg-45 mg-17.4mg tablet Take by mouth.       No current facility-administered medications for this visit.        Visit Vitals  /80 (BP Location: Left arm)   Pulse 78   Ht  1.524 m (5')   Wt 70.8 kg (156 lb)   SpO2 94%   BMI 30.47 kg/m²   OB Status Postmenopausal   Smoking Status Never   BSA 1.73 m²        Objective     Constitutional:       Appearance: Not in distress.   Neck:      Vascular: JVD normal.   Pulmonary:      Breath sounds: Normal breath sounds.   Cardiovascular:      Normal rate. Regular rhythm. Normal S1. Normal S2.       Murmurs: There is no murmur.      No gallop.    Pulses:     Intact distal pulses.   Edema:     Peripheral edema absent.   Abdominal:      General: There is no distension.      Palpations: Abdomen is soft.   Neurological:      Mental Status: Alert.         Lab Review:   Lab Results   Component Value Date     02/28/2024    K 4.1 02/28/2024     02/28/2024    CO2 25 02/28/2024    BUN 23 02/28/2024    CREATININE 0.78 02/28/2024    GLUCOSE 101 (H) 02/28/2024    CALCIUM 9.5 02/28/2024     Lab Results   Component Value Date    CHOL 258 (H) 11/17/2023    TRIG 195 (H) 11/17/2023    HDL 53.4 11/17/2023       Assessment:    1.  Hypertension.  Blood pressures are well-controlled.    2.  Hyperlipidemia.  Cholesterol is 258 with an LDL of 166.  Because her coronary CT calcium score is 0, according to current guidelines she does not require treatment.

## 2024-12-26 ENCOUNTER — TELEPHONE (OUTPATIENT)
Dept: PRIMARY CARE | Facility: CLINIC | Age: 74
End: 2024-12-26

## 2024-12-26 ENCOUNTER — LAB (OUTPATIENT)
Dept: LAB | Facility: LAB | Age: 74
End: 2024-12-26
Payer: MEDICARE

## 2024-12-26 ENCOUNTER — APPOINTMENT (OUTPATIENT)
Dept: PRIMARY CARE | Facility: CLINIC | Age: 74
End: 2024-12-26
Payer: MEDICARE

## 2024-12-26 VITALS
TEMPERATURE: 98.5 F | RESPIRATION RATE: 14 BRPM | SYSTOLIC BLOOD PRESSURE: 132 MMHG | DIASTOLIC BLOOD PRESSURE: 82 MMHG | HEIGHT: 60 IN | HEART RATE: 67 BPM | OXYGEN SATURATION: 94 % | WEIGHT: 159.6 LBS | BODY MASS INDEX: 31.33 KG/M2

## 2024-12-26 DIAGNOSIS — E66.811 CLASS 1 OBESITY DUE TO EXCESS CALORIES WITH SERIOUS COMORBIDITY AND BODY MASS INDEX (BMI) OF 31.0 TO 31.9 IN ADULT: ICD-10-CM

## 2024-12-26 DIAGNOSIS — E66.811 CLASS 1 OBESITY DUE TO EXCESS CALORIES WITH SERIOUS COMORBIDITY AND BODY MASS INDEX (BMI) OF 30.0 TO 30.9 IN ADULT: ICD-10-CM

## 2024-12-26 DIAGNOSIS — E78.2 MIXED HYPERLIPIDEMIA: ICD-10-CM

## 2024-12-26 DIAGNOSIS — M85.80 OSTEOPENIA, UNSPECIFIED LOCATION: ICD-10-CM

## 2024-12-26 DIAGNOSIS — R35.0 URINARY FREQUENCY: ICD-10-CM

## 2024-12-26 DIAGNOSIS — R10.2 PELVIC PRESSURE IN FEMALE: ICD-10-CM

## 2024-12-26 DIAGNOSIS — Z78.0 POST-MENOPAUSAL: ICD-10-CM

## 2024-12-26 DIAGNOSIS — E66.09 CLASS 1 OBESITY DUE TO EXCESS CALORIES WITH SERIOUS COMORBIDITY AND BODY MASS INDEX (BMI) OF 30.0 TO 30.9 IN ADULT: ICD-10-CM

## 2024-12-26 DIAGNOSIS — I10 PRIMARY HYPERTENSION: ICD-10-CM

## 2024-12-26 DIAGNOSIS — E66.09 CLASS 1 OBESITY DUE TO EXCESS CALORIES WITH SERIOUS COMORBIDITY AND BODY MASS INDEX (BMI) OF 31.0 TO 31.9 IN ADULT: ICD-10-CM

## 2024-12-26 DIAGNOSIS — Z00.00 MEDICARE ANNUAL WELLNESS VISIT, SUBSEQUENT: Primary | ICD-10-CM

## 2024-12-26 DIAGNOSIS — Z12.31 ENCOUNTER FOR SCREENING MAMMOGRAM FOR MALIGNANT NEOPLASM OF BREAST: ICD-10-CM

## 2024-12-26 PROBLEM — R14.0 ABDOMINAL DISTENSION (GASEOUS): Status: RESOLVED | Noted: 2024-02-28 | Resolved: 2024-12-26

## 2024-12-26 LAB
25(OH)D3 SERPL-MCNC: 28 NG/ML (ref 30–100)
ALBUMIN SERPL BCP-MCNC: 4.1 G/DL (ref 3.4–5)
ALP SERPL-CCNC: 76 U/L (ref 33–136)
ALT SERPL W P-5'-P-CCNC: 13 U/L (ref 7–45)
ANION GAP SERPL CALC-SCNC: 17 MMOL/L (ref 10–20)
APPEARANCE UR: CLEAR
AST SERPL W P-5'-P-CCNC: 19 U/L (ref 9–39)
BILIRUB SERPL-MCNC: 0.4 MG/DL (ref 0–1.2)
BILIRUB UR STRIP.AUTO-MCNC: NEGATIVE MG/DL
BUN SERPL-MCNC: 27 MG/DL (ref 6–23)
CALCIUM SERPL-MCNC: 9.6 MG/DL (ref 8.6–10.6)
CHLORIDE SERPL-SCNC: 105 MMOL/L (ref 98–107)
CHOLEST SERPL-MCNC: 256 MG/DL (ref 0–199)
CHOLESTEROL/HDL RATIO: 4.8
CO2 SERPL-SCNC: 23 MMOL/L (ref 21–32)
COLOR UR: COLORLESS
CREAT SERPL-MCNC: 0.87 MG/DL (ref 0.5–1.05)
EGFRCR SERPLBLD CKD-EPI 2021: 69 ML/MIN/1.73M*2
ERYTHROCYTE [DISTWIDTH] IN BLOOD BY AUTOMATED COUNT: 13.2 % (ref 11.5–14.5)
GLUCOSE SERPL-MCNC: 104 MG/DL (ref 74–99)
GLUCOSE UR STRIP.AUTO-MCNC: NORMAL MG/DL
HCT VFR BLD AUTO: 40.4 % (ref 36–46)
HDLC SERPL-MCNC: 53.4 MG/DL
HGB BLD-MCNC: 13.2 G/DL (ref 12–16)
HOLD SPECIMEN: NORMAL
KETONES UR STRIP.AUTO-MCNC: NEGATIVE MG/DL
LDLC SERPL CALC-MCNC: 176 MG/DL
LEUKOCYTE ESTERASE UR QL STRIP.AUTO: NEGATIVE
MCH RBC QN AUTO: 29.5 PG (ref 26–34)
MCHC RBC AUTO-ENTMCNC: 32.7 G/DL (ref 32–36)
MCV RBC AUTO: 90 FL (ref 80–100)
NITRITE UR QL STRIP.AUTO: NEGATIVE
NON HDL CHOLESTEROL: 203 MG/DL (ref 0–149)
NRBC BLD-RTO: 0 /100 WBCS (ref 0–0)
PH UR STRIP.AUTO: 5 [PH]
PLATELET # BLD AUTO: 223 X10*3/UL (ref 150–450)
POTASSIUM SERPL-SCNC: 4.1 MMOL/L (ref 3.5–5.3)
PROT SERPL-MCNC: 6.3 G/DL (ref 6.4–8.2)
PROT UR STRIP.AUTO-MCNC: NEGATIVE MG/DL
RBC # BLD AUTO: 4.48 X10*6/UL (ref 4–5.2)
RBC # UR STRIP.AUTO: NEGATIVE /UL
SODIUM SERPL-SCNC: 141 MMOL/L (ref 136–145)
SP GR UR STRIP.AUTO: 1.01
TRIGL SERPL-MCNC: 131 MG/DL (ref 0–149)
UROBILINOGEN UR STRIP.AUTO-MCNC: NORMAL MG/DL
VLDL: 26 MG/DL (ref 0–40)
WBC # BLD AUTO: 5.3 X10*3/UL (ref 4.4–11.3)

## 2024-12-26 PROCEDURE — 85027 COMPLETE CBC AUTOMATED: CPT

## 2024-12-26 PROCEDURE — 1170F FXNL STATUS ASSESSED: CPT | Performed by: INTERNAL MEDICINE

## 2024-12-26 PROCEDURE — 80061 LIPID PANEL: CPT

## 2024-12-26 PROCEDURE — 1036F TOBACCO NON-USER: CPT | Performed by: INTERNAL MEDICINE

## 2024-12-26 PROCEDURE — 1158F ADVNC CARE PLAN TLK DOCD: CPT | Performed by: INTERNAL MEDICINE

## 2024-12-26 PROCEDURE — 1160F RVW MEDS BY RX/DR IN RCRD: CPT | Performed by: INTERNAL MEDICINE

## 2024-12-26 PROCEDURE — 82306 VITAMIN D 25 HYDROXY: CPT

## 2024-12-26 PROCEDURE — 99214 OFFICE O/P EST MOD 30 MIN: CPT | Performed by: INTERNAL MEDICINE

## 2024-12-26 PROCEDURE — 80053 COMPREHEN METABOLIC PANEL: CPT

## 2024-12-26 PROCEDURE — 1123F ACP DISCUSS/DSCN MKR DOCD: CPT | Performed by: INTERNAL MEDICINE

## 2024-12-26 PROCEDURE — 3075F SYST BP GE 130 - 139MM HG: CPT | Performed by: INTERNAL MEDICINE

## 2024-12-26 PROCEDURE — 1159F MED LIST DOCD IN RCRD: CPT | Performed by: INTERNAL MEDICINE

## 2024-12-26 PROCEDURE — 81003 URINALYSIS AUTO W/O SCOPE: CPT

## 2024-12-26 PROCEDURE — G0439 PPPS, SUBSEQ VISIT: HCPCS | Performed by: INTERNAL MEDICINE

## 2024-12-26 PROCEDURE — 3079F DIAST BP 80-89 MM HG: CPT | Performed by: INTERNAL MEDICINE

## 2024-12-26 ASSESSMENT — ENCOUNTER SYMPTOMS
EYE DISCHARGE: 0
DIFFICULTY URINATING: 0
FATIGUE: 0
SINUS PAIN: 0
ARTHRALGIAS: 0
SLEEP DISTURBANCE: 0
WHEEZING: 0
DYSPHORIC MOOD: 0
POLYDIPSIA: 0
COUGH: 0
WOUND: 0
EYE REDNESS: 0
SHORTNESS OF BREATH: 0
SPEECH DIFFICULTY: 0
DEPRESSION: 0
JOINT SWELLING: 0
APNEA: 0
ANAL BLEEDING: 0
CONSTIPATION: 0
TROUBLE SWALLOWING: 0
VOICE CHANGE: 0
NUMBNESS: 0
BACK PAIN: 0
HEMATURIA: 0
DECREASED CONCENTRATION: 0
FLANK PAIN: 0
FEVER: 0
HEADACHES: 0
CHEST TIGHTNESS: 0
EYE PAIN: 0
HYPERACTIVE: 0
NERVOUS/ANXIOUS: 1
FACIAL SWELLING: 0
SEIZURES: 0
SORE THROAT: 0
ACTIVITY CHANGE: 0
EYE ITCHING: 0
LOSS OF SENSATION IN FEET: 0
ABDOMINAL DISTENTION: 0
ABDOMINAL PAIN: 0
OCCASIONAL FEELINGS OF UNSTEADINESS: 0
NECK PAIN: 0
BRUISES/BLEEDS EASILY: 0
PALPITATIONS: 0
DYSURIA: 0
TREMORS: 0
CHOKING: 0
SINUS PRESSURE: 0
CONFUSION: 0
LIGHT-HEADEDNESS: 0
APPETITE CHANGE: 0
CHILLS: 0
FREQUENCY: 1
STRIDOR: 0
UNEXPECTED WEIGHT CHANGE: 0
MYALGIAS: 0
DIARRHEA: 0
BLOOD IN STOOL: 0
RHINORRHEA: 0
FACIAL ASYMMETRY: 0
NECK STIFFNESS: 0
VOMITING: 0
PHOTOPHOBIA: 0
DIAPHORESIS: 0
POLYPHAGIA: 0
AGITATION: 0
RECTAL PAIN: 0
DIZZINESS: 0
WEAKNESS: 0
ADENOPATHY: 0
NAUSEA: 0
COLOR CHANGE: 0
HALLUCINATIONS: 0

## 2024-12-26 ASSESSMENT — ACTIVITIES OF DAILY LIVING (ADL)
MANAGING_FINANCES: INDEPENDENT
GROCERY_SHOPPING: INDEPENDENT
BATHING: INDEPENDENT
DRESSING: INDEPENDENT
DOING_HOUSEWORK: INDEPENDENT
TAKING_MEDICATION: INDEPENDENT

## 2024-12-26 NOTE — PATIENT INSTRUCTIONS
Get your blood work done today-we will call with results  Call and schedule your bone density and your mammogram-orders are  Continue your medications as directed-call when refills needed  Consider and do recommend the pneumonia shot (prevnar 20) one shot only   Work hard on weight control/loss-healthy diet and exercise regularly  Follow up in 6 months

## 2024-12-26 NOTE — ASSESSMENT & PLAN NOTE
Not on meds  Ct cardiac has been zero   Orders:    Lipid Panel; Future    Follow Up In Primary Care - Established; Future

## 2024-12-26 NOTE — ASSESSMENT & PLAN NOTE
BP well controlled on current regimen   Continue meds as directed    Orders:    Follow Up In Primary Care - Medicare Annual    Comprehensive Metabolic Panel; Future    CBC; Future    Follow Up In Primary Care - [facility]; Future    Urinalysis with Reflex Culture and Microscopic; Future

## 2024-12-26 NOTE — ASSESSMENT & PLAN NOTE
Repeat dexa ordered  Recommend calcium in diet   Vitamin D   Exercising/walking     Orders:    Vitamin D 25-Hydroxy,Total (for eval of Vitamin D levels); Future

## 2024-12-26 NOTE — TELEPHONE ENCOUNTER
"Lets get her blood work done-see if she gave a urine sample at the lab as she had urinary symptoms I was worried about infection and that may be cause of these \"hot flashes\" as well   "

## 2024-12-26 NOTE — TELEPHONE ENCOUNTER
"Patient called and forgot to tell you she has been having \"hot flashes\" one or two times a day.  She is not physicialy sweating, but feels like she should not be having these symptoms at her age.  What do you suggest?  "

## 2024-12-26 NOTE — ASSESSMENT & PLAN NOTE
Orders:    Follow Up In Primary Care - Medicare Annual    Follow Up In Primary Care - Established; Future

## 2024-12-26 NOTE — PROGRESS NOTES
Subjective   Reason for Visit: Blaire Dahl is an 74 y.o. female here for a Medicare Wellness visit.     Past Medical, Surgical, and Family History reviewed and updated in chart.    Reviewed all medications by prescribing practitioner or clinical pharmacist (such as prescriptions, OTCs, herbal therapies and supplements) and documented in the medical record.    HPI  Pt here for MWV.  Her weight is stable.  She continues to be very anxious about her healthy.  She almost cancelled appt due to worry we will find something bad.      Mammogram was normal in 1/2024.  She had some pain in left breast awhile back but no longer.  She does breast exams and hasn't felt anything.     She has osteopenia based on dexa from 12/2022.  No falls.  On vitamin D.      She had polyps (tubular adenoma) noted from colonoscopy 2021.  She had a GI bug recently that lasted 24 hours.  She has some harder bowels since but no other issues.      She had her annual cardiology visit on 12/19 with Dr. Altman.  BP was noted to be good.  She has high cholesterol but CT cardiac score of zero.      She has some urinary frequency and some pressure.  This just started. Her urine was darker too-she started drinking more water and it has improved/cleared.        Patient Care Team:  Marsha VANCE DO as PCP - General  Gavin Altman MD as Consulting Physician (Cardiology)     Review of Systems   Constitutional:  Negative for activity change, appetite change, chills, diaphoresis, fatigue, fever and unexpected weight change.   HENT:  Positive for hearing loss and postnasal drip. Negative for congestion, dental problem, drooling, ear discharge, ear pain, facial swelling, mouth sores, nosebleeds, rhinorrhea, sinus pressure, sinus pain, sneezing, sore throat, tinnitus, trouble swallowing and voice change.    Eyes:  Negative for photophobia, pain, discharge, redness, itching and visual disturbance (up to date on exam).   Respiratory:  Negative for apnea,  cough, choking, chest tightness, shortness of breath, wheezing and stridor.    Cardiovascular:  Negative for chest pain, palpitations and leg swelling.   Gastrointestinal:  Negative for abdominal distention, abdominal pain, anal bleeding, blood in stool, constipation, diarrhea, nausea, rectal pain and vomiting.   Endocrine: Negative for cold intolerance, heat intolerance, polydipsia, polyphagia and polyuria.   Genitourinary:  Positive for frequency. Negative for decreased urine volume, difficulty urinating, dyspareunia, dysuria, enuresis, flank pain, genital sores, hematuria, menstrual problem, pelvic pain, urgency, vaginal bleeding, vaginal discharge and vaginal pain.   Musculoskeletal:  Negative for arthralgias, back pain, gait problem, joint swelling, myalgias, neck pain and neck stiffness.   Skin:  Negative for color change, pallor, rash and wound.   Allergic/Immunologic: Negative for environmental allergies, food allergies and immunocompromised state.   Neurological:  Negative for dizziness, tremors, seizures, syncope, facial asymmetry, speech difficulty, weakness, light-headedness, numbness and headaches.   Hematological:  Negative for adenopathy. Does not bruise/bleed easily.   Psychiatric/Behavioral:  Negative for agitation, behavioral problems, confusion, decreased concentration, dysphoric mood, hallucinations, self-injury, sleep disturbance and suicidal ideas. The patient is nervous/anxious. The patient is not hyperactive.        Objective   Vitals:  /82 (BP Location: Right arm, Patient Position: Sitting)   Pulse 67   Temp 36.9 °C (98.5 °F) (Oral)   Resp 14   Ht 1.524 m (5')   Wt 72.4 kg (159 lb 9.6 oz)   SpO2 94%   BMI 31.17 kg/m²       Physical Exam  Constitutional:       Appearance: Normal appearance. She is obese.   HENT:      Head: Normocephalic and atraumatic.      Right Ear: Tympanic membrane, ear canal and external ear normal. There is no impacted cerumen.      Left Ear: Tympanic  membrane, ear canal and external ear normal. There is no impacted cerumen.      Nose: Nose normal. No congestion or rhinorrhea.      Mouth/Throat:      Mouth: Mucous membranes are moist.      Pharynx: Oropharynx is clear. No oropharyngeal exudate or posterior oropharyngeal erythema.   Eyes:      Extraocular Movements: Extraocular movements intact.      Conjunctiva/sclera: Conjunctivae normal.      Pupils: Pupils are equal, round, and reactive to light.   Neck:      Vascular: No carotid bruit.   Cardiovascular:      Rate and Rhythm: Normal rate and regular rhythm.      Pulses: Normal pulses.      Heart sounds: Normal heart sounds. No murmur heard.  Pulmonary:      Effort: Pulmonary effort is normal. No respiratory distress.      Breath sounds: Normal breath sounds. No wheezing, rhonchi or rales.   Abdominal:      General: Abdomen is flat. Bowel sounds are normal. There is no distension.      Palpations: Abdomen is soft.      Tenderness: There is no abdominal tenderness.      Hernia: No hernia is present.   Musculoskeletal:         General: No swelling or tenderness. Normal range of motion.      Cervical back: Normal range of motion and neck supple.      Right lower leg: No edema.      Left lower leg: No edema.   Lymphadenopathy:      Cervical: No cervical adenopathy.   Skin:     General: Skin is warm and dry.      Findings: No lesion or rash.   Neurological:      General: No focal deficit present.      Mental Status: She is alert and oriented to person, place, and time.      Cranial Nerves: No cranial nerve deficit.      Sensory: No sensory deficit.      Motor: No weakness.   Psychiatric:         Mood and Affect: Mood normal.         Behavior: Behavior normal.         Thought Content: Thought content normal.         Judgment: Judgment normal.         Assessment & Plan  Primary hypertension  BP well controlled on current regimen   Continue meds as directed    Orders:    Follow Up In Primary Care - Medicare Annual     Comprehensive Metabolic Panel; Future    CBC; Future    Follow Up In Primary Care - [facility]; Future    Urinalysis with Reflex Culture and Microscopic; Future    Class 1 obesity due to excess calories with serious comorbidity and body mass index (BMI) of 30.0 to 30.9 in adult    Orders:    Follow Up In Primary Care - Medicare Annual    Follow Up In Primary Care - Established; Future    Mixed hyperlipidemia  Not on meds  Ct cardiac has been zero   Orders:    Lipid Panel; Future    Follow Up In Primary Care - [facility]; Future    Class 1 obesity due to excess calories with serious comorbidity and body mass index (BMI) of 31.0 to 31.9 in adult  Encouraged her to continue to work on healthy diet and exercise regularly  She follows with [facility] more to prevent gain/maintain        Medicare annual wellness visit, subsequent  Recommend Prevnar/Flu/Covid/Shingrix-pt declines at this time         Encounter for screening mammogram for malignant neoplasm of breast    Orders:    BI mammo bilateral screening tomosynthesis; Future    Post-menopausal    Orders:    XR DEXA bone density; Future    Osteopenia, unspecified location  Repeat dexa ordered  Recommend calcium in diet   Vitamin D   Exercising/walking     Orders:    Vitamin D 25-Hydroxy,Total (for eval of Vitamin D levels); Future    Pelvic pressure in female    Orders:    Urinalysis with Reflex Culture and Microscopic; Future    Urinary frequency  Will do urinalysis with culture due to some acute symptoms she is having   Orders:    Urinalysis with Reflex Culture and Microscopic; Future

## 2024-12-26 NOTE — ASSESSMENT & PLAN NOTE
Encouraged her to continue to work on healthy diet and exercise regularly  She follows with [facility] more to prevent gain/maintain

## 2024-12-27 ENCOUNTER — TELEPHONE (OUTPATIENT)
Dept: PRIMARY CARE | Facility: CLINIC | Age: 74
End: 2024-12-27
Payer: MEDICARE

## 2024-12-27 NOTE — TELEPHONE ENCOUNTER
Called and informed patient on results- patient understands and has no other questions or concerns noted at this time. States that she will get better at taking the Vitamin d 2000 units daily as she often forgets to take it. Informed patient that we are still awaiting urine culture and will call once we get those results.

## 2024-12-27 NOTE — TELEPHONE ENCOUNTER
----- Message from Marsha Arteaga sent at 12/27/2024  7:36 AM EST -----  Labs are overall stable/normal compared to previous years; her cholesterol is high which it has been-her CT cardiac is zero so she hasn't been on meds for this; continue to just improve diet-more plant based, healthy oils and exercise more regularly even if just walks  Waiting on urine culture but urinalysis was normal   Her vitamin D is still a bit low so would take 2000 units/day-if already doing so then increase to 4000 units/day

## 2025-01-07 ENCOUNTER — HOSPITAL ENCOUNTER (OUTPATIENT)
Dept: RADIOLOGY | Facility: CLINIC | Age: 75
Discharge: HOME | End: 2025-01-07
Payer: MEDICARE

## 2025-01-07 DIAGNOSIS — Z12.31 ENCOUNTER FOR SCREENING MAMMOGRAM FOR MALIGNANT NEOPLASM OF BREAST: ICD-10-CM

## 2025-01-07 PROCEDURE — 77067 SCR MAMMO BI INCL CAD: CPT

## 2025-01-07 PROCEDURE — 77063 BREAST TOMOSYNTHESIS BI: CPT | Performed by: RADIOLOGY

## 2025-01-07 PROCEDURE — 77067 SCR MAMMO BI INCL CAD: CPT | Performed by: RADIOLOGY

## 2025-01-11 ENCOUNTER — HOSPITAL ENCOUNTER (OUTPATIENT)
Dept: RADIOLOGY | Facility: CLINIC | Age: 75
Discharge: HOME | End: 2025-01-11
Payer: MEDICARE

## 2025-01-11 DIAGNOSIS — Z78.0 POST-MENOPAUSAL: ICD-10-CM

## 2025-01-11 PROCEDURE — 77080 DXA BONE DENSITY AXIAL: CPT | Performed by: RADIOLOGY

## 2025-01-11 PROCEDURE — 77080 DXA BONE DENSITY AXIAL: CPT

## 2025-01-13 ENCOUNTER — TELEPHONE (OUTPATIENT)
Dept: PRIMARY CARE | Facility: CLINIC | Age: 75
End: 2025-01-13
Payer: MEDICARE

## 2025-01-13 NOTE — TELEPHONE ENCOUNTER
Called and informed patient on results- patient understands and has no other questions or concerns noted at this time. She informed me that she will take these supplements and try to increase exercise/walking.

## 2025-01-13 NOTE — TELEPHONE ENCOUNTER
----- Message from Marsha Arteaga sent at 1/13/2025  7:53 AM EST -----  Pt has osteopenia on dexa and it has slightly progressed (worsened) from 2022-so a bit more bone mass has been lost-this put more risk for fracture if she was to fall, just encourage her to take calcium 6726-2356 mg in supplementation or in diet and vitamin D 2000 units/day  Encourage walking/exercise to help strengthen surrounding muscles/bones

## 2025-01-13 NOTE — TELEPHONE ENCOUNTER
----- Message from Marsha Arteaga sent at 1/13/2025  7:53 AM EST -----  Pt has osteopenia on dexa and it has slightly progressed (worsened) from 2022-so a bit more bone mass has been lost-this put more risk for fracture if she was to fall, just encourage her to take calcium 7754-6997 mg in supplementation or in diet and vitamin D 2000 units/day  Encourage walking/exercise to help strengthen surrounding muscles/bones

## 2025-06-02 DIAGNOSIS — I10 ESSENTIAL (PRIMARY) HYPERTENSION: Primary | ICD-10-CM

## 2025-06-02 RX ORDER — LOSARTAN POTASSIUM 100 MG/1
100 TABLET ORAL DAILY
Qty: 90 TABLET | Refills: 3 | Status: SHIPPED | OUTPATIENT
Start: 2025-06-02 | End: 2025-06-03 | Stop reason: SDUPTHER

## 2025-06-03 DIAGNOSIS — I10 ESSENTIAL (PRIMARY) HYPERTENSION: ICD-10-CM

## 2025-06-03 RX ORDER — LOSARTAN POTASSIUM 100 MG/1
100 TABLET ORAL DAILY
Qty: 90 TABLET | Refills: 3 | Status: SHIPPED | OUTPATIENT
Start: 2025-06-03 | End: 2026-06-03

## 2025-06-27 ENCOUNTER — APPOINTMENT (OUTPATIENT)
Dept: PRIMARY CARE | Facility: CLINIC | Age: 75
End: 2025-06-27
Payer: MEDICARE

## 2025-07-01 ENCOUNTER — APPOINTMENT (OUTPATIENT)
Dept: PRIMARY CARE | Facility: CLINIC | Age: 75
End: 2025-07-01
Payer: MEDICARE

## 2025-07-01 VITALS
BODY MASS INDEX: 30.13 KG/M2 | OXYGEN SATURATION: 96 % | SYSTOLIC BLOOD PRESSURE: 112 MMHG | HEART RATE: 91 BPM | WEIGHT: 154.3 LBS | DIASTOLIC BLOOD PRESSURE: 77 MMHG

## 2025-07-01 DIAGNOSIS — R73.9 HYPERGLYCEMIA: ICD-10-CM

## 2025-07-01 DIAGNOSIS — I10 ESSENTIAL (PRIMARY) HYPERTENSION: ICD-10-CM

## 2025-07-01 DIAGNOSIS — M85.80 OSTEOPENIA, UNSPECIFIED LOCATION: ICD-10-CM

## 2025-07-01 DIAGNOSIS — R35.0 URINARY FREQUENCY: ICD-10-CM

## 2025-07-01 DIAGNOSIS — L29.9 ITCHING: ICD-10-CM

## 2025-07-01 DIAGNOSIS — E78.2 MIXED HYPERLIPIDEMIA: ICD-10-CM

## 2025-07-01 DIAGNOSIS — I10 PRIMARY HYPERTENSION: Primary | ICD-10-CM

## 2025-07-01 LAB — POC HEMOGLOBIN A1C: 5.4 % (ref 4.2–6.5)

## 2025-07-01 PROCEDURE — 1160F RVW MEDS BY RX/DR IN RCRD: CPT | Performed by: INTERNAL MEDICINE

## 2025-07-01 PROCEDURE — 1159F MED LIST DOCD IN RCRD: CPT | Performed by: INTERNAL MEDICINE

## 2025-07-01 PROCEDURE — 1036F TOBACCO NON-USER: CPT | Performed by: INTERNAL MEDICINE

## 2025-07-01 PROCEDURE — 83036 HEMOGLOBIN GLYCOSYLATED A1C: CPT | Performed by: INTERNAL MEDICINE

## 2025-07-01 PROCEDURE — 1123F ACP DISCUSS/DSCN MKR DOCD: CPT | Performed by: INTERNAL MEDICINE

## 2025-07-01 PROCEDURE — 99214 OFFICE O/P EST MOD 30 MIN: CPT | Performed by: INTERNAL MEDICINE

## 2025-07-01 PROCEDURE — 3078F DIAST BP <80 MM HG: CPT | Performed by: INTERNAL MEDICINE

## 2025-07-01 PROCEDURE — 3074F SYST BP LT 130 MM HG: CPT | Performed by: INTERNAL MEDICINE

## 2025-07-01 RX ORDER — HYDROCHLOROTHIAZIDE 12.5 MG/1
12.5 TABLET ORAL
Qty: 90 TABLET | Refills: 3 | Status: SHIPPED | OUTPATIENT
Start: 2025-07-01

## 2025-07-01 ASSESSMENT — ENCOUNTER SYMPTOMS
LIGHT-HEADEDNESS: 0
CONSTIPATION: 0
DIFFICULTY URINATING: 0
ABDOMINAL DISTENTION: 0
ABDOMINAL PAIN: 0
VOMITING: 0
COUGH: 0
DIARRHEA: 0
HEADACHES: 0
WHEEZING: 0
DIZZINESS: 0
NAUSEA: 0
CHEST TIGHTNESS: 0
PALPITATIONS: 0
SHORTNESS OF BREATH: 0
ROS SKIN COMMENTS: +ITCHING

## 2025-07-01 NOTE — PROGRESS NOTES
Subjective   Patient ID: Blaire Dahl is a 74 y.o. female who presents for Follow-up (6 month ).    HPI  Pt here for 6 month follow up.  She tells me she started itching full body about 2 weeks ago.  It has improved and lessened but still present.  When itchy its different parts of body-face/arm/back.  No rash.  She denies any new soaps/detergents/products or meds.  She denies working in yard.      Since our last visit she had eye appt in April.  She has macular degeneration of both eyes-dry stage.    She had her ears checked when her  was getting his done-6/16-she was told had lesion in her right ear.  She doesn't have pain or itching in ear.      She has osteopenia noted on Dexa in 1/2025.  She doesn't drink milk or eat much cheese.  She is on vitamin D.    She had normal mammogram in 1/2025.      She is worried she has diabetes-her family had it (mom, cousins).  She feels lately she has been drinking more and urinating a bit more.  Her sugar in 12/2024 labs was 104.  Pt admits always very anxious.  Looks online about symptoms and then worries as she always feels she has a terminal condition.      Review of Systems   HENT:  Negative for ear discharge, ear pain and hearing loss.    Eyes:  Negative for visual disturbance.   Respiratory:  Negative for cough, chest tightness, shortness of breath and wheezing.    Cardiovascular:  Negative for chest pain, palpitations and leg swelling.   Gastrointestinal:  Negative for abdominal distention, abdominal pain, constipation, diarrhea, nausea and vomiting.   Genitourinary:  Negative for difficulty urinating.   Skin:         +itching    Neurological:  Negative for dizziness, light-headedness and headaches.       Objective   /77 (BP Location: Right arm, Patient Position: Sitting)   Pulse 91   Wt 70 kg (154 lb 4.8 oz)   SpO2 96%   BMI 30.13 kg/m²    Physical Exam  Constitutional:       Appearance: Normal appearance.   Cardiovascular:      Rate and Rhythm:  Normal rate and regular rhythm.      Heart sounds: Normal heart sounds.   Pulmonary:      Effort: Pulmonary effort is normal.      Breath sounds: Normal breath sounds.   Abdominal:      General: Bowel sounds are normal.      Palpations: Abdomen is soft.   Musculoskeletal:      Right lower leg: No edema.      Left lower leg: No edema.   Lymphadenopathy:      Cervical: No cervical adenopathy.   Skin:     Comments: Dry skin noted on back with some excoriations    Neurological:      Mental Status: She is alert and oriented to person, place, and time.   Psychiatric:         Mood and Affect: Mood normal.         Assessment/Plan   Problem List Items Addressed This Visit       HTN (hypertension) - Primary    Well controlled on current meds  Low salt diet         Relevant Orders    Follow Up In Primary Care - Medicare Annual    Hyperlipidemia    Still high but CT cardiac score was zero in 2022   Improve diet; move more         Relevant Orders    Follow Up In Primary Care - Medicare Annual    Osteopenia    Recommend calcium 2610-4196 mg/day in supplements  Vitamin D 2000 units/day   Weight based exercises          Itching    From dry skin/allergies  Recommend moisturizing skin as well as antihistamines as needed         Relevant Orders    Follow Up In Primary Care - Medicare Annual     Other Visit Diagnoses         Essential (primary) hypertension        Relevant Medications    hydroCHLOROthiazide (Microzide) 12.5 mg tablet      Hyperglycemia        Relevant Orders    POCT glycosylated hemoglobin (Hb A1C) manually resulted (Completed)      Urinary frequency        Relevant Orders    POCT glycosylated hemoglobin (Hb A1C) manually resulted (Completed)

## 2025-07-01 NOTE — ASSESSMENT & PLAN NOTE
Recommend calcium 9508-3906 mg/day in supplements  Vitamin D 2000 units/day   Weight based exercises

## 2025-07-01 NOTE — PATIENT INSTRUCTIONS
When feeling more itchy would take antihistamine such as Claritin 10 mg and take one a day  Use moisturizers to help keep skin moist as you appear more dry which will cause itching  No ear lesions noted today  Continue meds as directed-refill on hydrochlorothiazide  Start calcium 1000 mg/day-get either separate or combo with vitamin D; continue your vitamin D 2000 units/day and walk more-weight bearing exercise to help with bone health   Follow up in early 12/2025 for full physical (with blood work)

## 2025-12-02 ENCOUNTER — APPOINTMENT (OUTPATIENT)
Dept: PRIMARY CARE | Facility: CLINIC | Age: 75
End: 2025-12-02
Payer: MEDICARE

## 2025-12-18 ENCOUNTER — APPOINTMENT (OUTPATIENT)
Dept: CARDIOLOGY | Facility: CLINIC | Age: 75
End: 2025-12-18
Payer: MEDICARE

## 2025-12-19 ENCOUNTER — APPOINTMENT (OUTPATIENT)
Dept: CARDIOLOGY | Facility: CLINIC | Age: 75
End: 2025-12-19
Payer: MEDICARE